# Patient Record
Sex: MALE | URBAN - METROPOLITAN AREA
[De-identification: names, ages, dates, MRNs, and addresses within clinical notes are randomized per-mention and may not be internally consistent; named-entity substitution may affect disease eponyms.]

---

## 2023-03-03 ENCOUNTER — OFFICE VISIT (OUTPATIENT)
Dept: URBAN - METROPOLITAN AREA CLINIC 35 | Facility: CLINIC | Age: 48
End: 2023-03-03
Payer: SELF-PAY

## 2023-03-03 VITALS
HEART RATE: 54 BPM | HEIGHT: 66 IN | OXYGEN SATURATION: 99 % | SYSTOLIC BLOOD PRESSURE: 122 MMHG | BODY MASS INDEX: 22.98 KG/M2 | DIASTOLIC BLOOD PRESSURE: 80 MMHG | WEIGHT: 143 LBS

## 2023-03-03 DIAGNOSIS — Z12.11 ENCOUNTER FOR SCREENING FOR MALIGNANT NEOPLASM OF COLON: ICD-10-CM

## 2023-03-03 DIAGNOSIS — K59.1 FUNCTIONAL DIARRHEA: ICD-10-CM

## 2023-03-03 DIAGNOSIS — Z12.12 ENCOUNTER FOR SCREENING FOR MALIGNANT NEOPLASM OF RECTUM: ICD-10-CM

## 2023-03-03 DIAGNOSIS — R10.10 PAIN OF UPPER ABDOMEN: ICD-10-CM

## 2023-03-03 PROCEDURE — 99204 OFFICE O/P NEW MOD 45 MIN: CPT | Performed by: PHYSICIAN ASSISTANT

## 2023-03-03 RX ORDER — SODIUM, POTASSIUM,MAG SULFATES 17.5-3.13G
177ML SOLUTION, RECONSTITUTED, ORAL ORAL
Qty: 1 | Refills: 0 | OUTPATIENT
Start: 2023-03-03 | End: 2023-03-05

## 2023-03-03 RX ORDER — FAMOTIDINE 40 MG/1
1 TABLET AT BEDTIME TABLET, FILM COATED ORAL ONCE A DAY
Qty: 30 | Refills: 2 | OUTPATIENT
Start: 2023-03-03

## 2023-03-03 NOTE — HPI-ABDOMINAL PAIN
47 year old [place] speaking male patient presents today for abdominal pain that is located in the upper abdomen, and is intermittent in nature.  Son is .  Pain onset was about a year ago, with episodes occurring every day and symptoms are worse at night. Admits eating spicy foods and drinking alcohol as aggravating factors. Denies any alleviating factors.  He is unsure how to describe the abd pain.  Denies any recent imaging or procedures for symptoms.  No heartburn, dysphagia, nausea or emesis.  Never had an EGD.  He had labs drawn 2 months ago that were normal.

## 2023-03-03 NOTE — HPI-COLORECTAL CANCER SCREENING
Pt has never had a colonoscopy.  He denies fhx of colon cancer or colon polyps.  He admits to 2-3 bowel movements a day.  He admits to diarrhea intermittently a couple times a week.  He states spicy foods will trigger diarrhea.

## 2023-03-30 ENCOUNTER — OFFICE VISIT (OUTPATIENT)
Dept: URBAN - METROPOLITAN AREA SURGERY CENTER 8 | Facility: SURGERY CENTER | Age: 48
End: 2023-03-30
Payer: SELF-PAY

## 2023-03-30 ENCOUNTER — TELEPHONE ENCOUNTER (OUTPATIENT)
Dept: URBAN - METROPOLITAN AREA CLINIC 35 | Facility: CLINIC | Age: 48
End: 2023-03-30

## 2023-03-30 ENCOUNTER — CLAIMS CREATED FROM THE CLAIM WINDOW (OUTPATIENT)
Dept: URBAN - METROPOLITAN AREA CLINIC 4 | Facility: CLINIC | Age: 48
End: 2023-03-30
Payer: SELF-PAY

## 2023-03-30 ENCOUNTER — TELEPHONE ENCOUNTER (OUTPATIENT)
Dept: URBAN - METROPOLITAN AREA SURGERY CENTER 8 | Facility: SURGERY CENTER | Age: 48
End: 2023-03-30

## 2023-03-30 DIAGNOSIS — R10.12 ABDOMINAL BURNING SENSATION IN LEFT UPPER QUADRANT: ICD-10-CM

## 2023-03-30 DIAGNOSIS — R10.11 ABDOMINAL BURNING SENSATION IN RIGHT UPPER QUADRANT: ICD-10-CM

## 2023-03-30 DIAGNOSIS — Z12.11 COLON CANCER SCREENING: ICD-10-CM

## 2023-03-30 DIAGNOSIS — K90.0 CELIAC DISEASE: ICD-10-CM

## 2023-03-30 DIAGNOSIS — K31.89 ACQUIRED DEFORMITY OF DUODENUM: ICD-10-CM

## 2023-03-30 DIAGNOSIS — K29.60 ADENOPAPILLOMATOSIS GASTRICA: ICD-10-CM

## 2023-03-30 PROCEDURE — 88342 IMHCHEM/IMCYTCHM 1ST ANTB: CPT | Performed by: PATHOLOGY

## 2023-03-30 PROCEDURE — 43239 EGD BIOPSY SINGLE/MULTIPLE: CPT | Performed by: INTERNAL MEDICINE

## 2023-03-30 PROCEDURE — 88305 TISSUE EXAM BY PATHOLOGIST: CPT | Performed by: PATHOLOGY

## 2023-03-30 PROCEDURE — G0121 COLON CA SCRN NOT HI RSK IND: HCPCS | Performed by: INTERNAL MEDICINE

## 2023-03-30 PROCEDURE — G8907 PT DOC NO EVENTS ON DISCHARG: HCPCS | Performed by: INTERNAL MEDICINE

## 2023-03-30 RX ORDER — FAMOTIDINE 40 MG/1
1 TABLET AT BEDTIME TABLET, FILM COATED ORAL ONCE A DAY
Qty: 30 | Refills: 2 | Status: ACTIVE | COMMUNITY
Start: 2023-03-03

## 2023-03-30 RX ORDER — FAMOTIDINE 40 MG/1
1 TABLET AT BEDTIME TABLET, FILM COATED ORAL ONCE A DAY
Qty: 30 | Refills: 2
Start: 2023-03-03

## 2023-04-04 ENCOUNTER — LAB OUTSIDE AN ENCOUNTER (OUTPATIENT)
Dept: URBAN - METROPOLITAN AREA CLINIC 35 | Facility: CLINIC | Age: 48
End: 2023-04-04

## 2023-04-20 ENCOUNTER — OFFICE VISIT (OUTPATIENT)
Dept: URBAN - METROPOLITAN AREA CLINIC 35 | Facility: CLINIC | Age: 48
End: 2023-04-20
Payer: SELF-PAY

## 2023-04-20 VITALS
HEIGHT: 66 IN | DIASTOLIC BLOOD PRESSURE: 76 MMHG | BODY MASS INDEX: 23.14 KG/M2 | SYSTOLIC BLOOD PRESSURE: 120 MMHG | WEIGHT: 144 LBS

## 2023-04-20 DIAGNOSIS — R10.10 UPPER ABDOMINAL PAIN: ICD-10-CM

## 2023-04-20 DIAGNOSIS — K64.0 GRADE I HEMORRHOIDS: ICD-10-CM

## 2023-04-20 DIAGNOSIS — K44.9 HIATAL HERNIA: ICD-10-CM

## 2023-04-20 DIAGNOSIS — K31.7 BENIGN POLYP OF DUODENUM: ICD-10-CM

## 2023-04-20 DIAGNOSIS — K29.30 CHRONIC SUPERFICIAL GASTRITIS WITHOUT BLEEDING: ICD-10-CM

## 2023-04-20 PROBLEM — 682831000119104: Status: ACTIVE | Noted: 2023-04-20

## 2023-04-20 PROBLEM — 196735001: Status: ACTIVE | Noted: 2023-04-20

## 2023-04-20 PROCEDURE — 99214 OFFICE O/P EST MOD 30 MIN: CPT | Performed by: INTERNAL MEDICINE

## 2023-04-20 RX ORDER — FAMOTIDINE 40 MG/1
1 TABLET AT BEDTIME TABLET, FILM COATED ORAL ONCE A DAY
Qty: 30 | Refills: 2 | Status: ACTIVE | COMMUNITY
Start: 2023-03-03

## 2023-04-20 NOTE — HPI-ABDOMINAL PAIN
Admits continued episodes of abdominal pain since his last visit. He admits the use the continued use of famotidine 40 mg at bedtime with relief of symptoms.   Last office visit 03/03/2023 47 year old [place] speaking male patient presents today for abdominal pain that is located in the upper abdomen, and is intermittent in nature.  Son is .  Pain onset was about a year ago, with episodes occurring every day and symptoms are worse at night. Admits eating spicy foods and drinking alcohol as aggravating factors. Denies any alleviating factors.  He is unsure how to describe the abd pain.  Denies any recent imaging or procedures for symptoms.  No heartburn, dysphagia, nausea or emesis.  Never had an EGD.  He had labs drawn 2 months ago that were normal.

## 2023-04-20 NOTE — HPI-ENDOSCOPY (EGD) FOLLOWUP
EGD was completed on 03/30/2023 with findings of:  (A) Duodenum, Second Part (D2) INCREASED NUMBERS OF INTRAEPITHELIAL LYMPHOCYTES WITH NORMAL VILLOUS   (B) Duodenum, Second Part (D2) INCREASED NUMBERS OF INTRAEPITHELIAL LYMPHOCYTES WITH NORMAL VILLOUS               (C) CHRONIC INACTIVE GASTRITIS WITH HISTOLOGICAL CHANGES SUGGESTIVE OF              TREATED H. PYLORI GASTRITIS.  CT of the Abdomen Pelvis completed on 04/04/2023 was normal.

## 2023-04-25 LAB
H PYLORI BREATH TEST: NOT DETECTED
H. PYLORI BREATH TEST: NOT DETECTED
IMMUNOGLOBULIN A: 162
INTERPRETATION: (no result)
INTERPRETATION: NOT DETECTED
TISSUE TRANSGLUTAMINASE AB, IGA: <1

## 2023-05-03 ENCOUNTER — TELEPHONE ENCOUNTER (OUTPATIENT)
Dept: URBAN - METROPOLITAN AREA CLINIC 35 | Facility: CLINIC | Age: 48
End: 2023-05-03

## 2023-05-03 RX ORDER — DICYCLOMINE HYDROCHLORIDE 20 MG/1
1 TABLET TABLET ORAL
Qty: 30 | Refills: 1 | OUTPATIENT
Start: 2023-05-04 | End: 2023-07-03

## 2023-05-04 ENCOUNTER — LAB OUTSIDE AN ENCOUNTER (OUTPATIENT)
Dept: URBAN - METROPOLITAN AREA CLINIC 35 | Facility: CLINIC | Age: 48
End: 2023-05-04

## 2023-12-22 ENCOUNTER — DASHBOARD ENCOUNTERS (OUTPATIENT)
Age: 48
End: 2023-12-22

## 2023-12-22 ENCOUNTER — OFFICE VISIT (OUTPATIENT)
Dept: URBAN - METROPOLITAN AREA CLINIC 82 | Facility: CLINIC | Age: 48
End: 2023-12-22
Payer: SELF-PAY

## 2023-12-22 VITALS
TEMPERATURE: 98.1 F | BODY MASS INDEX: 23.21 KG/M2 | WEIGHT: 144.4 LBS | HEIGHT: 66 IN | SYSTOLIC BLOOD PRESSURE: 132 MMHG | DIASTOLIC BLOOD PRESSURE: 75 MMHG | HEART RATE: 51 BPM

## 2023-12-22 DIAGNOSIS — K29.30 CHRONIC SUPERFICIAL GASTRITIS WITHOUT BLEEDING: ICD-10-CM

## 2023-12-22 DIAGNOSIS — K31.7 BENIGN POLYP OF DUODENUM: ICD-10-CM

## 2023-12-22 DIAGNOSIS — K44.9 HERNIA, HIATAL: ICD-10-CM

## 2023-12-22 DIAGNOSIS — K21.9 GASTROESOPHAGEAL REFLUX DISEASE WITHOUT ESOPHAGITIS: ICD-10-CM

## 2023-12-22 PROBLEM — 84089009: Status: ACTIVE | Noted: 2023-12-22

## 2023-12-22 PROBLEM — 266435005: Status: ACTIVE | Noted: 2023-12-22

## 2023-12-22 PROCEDURE — 99213 OFFICE O/P EST LOW 20 MIN: CPT | Performed by: INTERNAL MEDICINE

## 2023-12-22 RX ORDER — FAMOTIDINE 40 MG/1
1 TABLET AT BEDTIME TABLET, FILM COATED ORAL ONCE A DAY
Qty: 30 | Refills: 2 | Status: ON HOLD | COMMUNITY
Start: 2023-03-03

## 2023-12-22 NOTE — HPI-TODAY'S VISIT:
47 y/o  man with recent procedures  on 3/2023 EGD shows small hiatal hernia,no Ugalde's,no H pylori on biopsies or UBT. Colonoscopy with no significant abnormalities or polyps.I review with him all reports including blood test and recommendations for GERD.

## 2024-12-13 ENCOUNTER — LAB OUTSIDE AN ENCOUNTER (OUTPATIENT)
Dept: URBAN - METROPOLITAN AREA CLINIC 82 | Facility: CLINIC | Age: 49
End: 2024-12-13

## 2024-12-13 ENCOUNTER — OFFICE VISIT (OUTPATIENT)
Dept: URBAN - METROPOLITAN AREA CLINIC 82 | Facility: CLINIC | Age: 49
End: 2024-12-13
Payer: SELF-PAY

## 2024-12-13 VITALS
TEMPERATURE: 98.6 F | BODY MASS INDEX: 23.66 KG/M2 | HEIGHT: 66 IN | WEIGHT: 147.2 LBS | DIASTOLIC BLOOD PRESSURE: 83 MMHG | SYSTOLIC BLOOD PRESSURE: 135 MMHG | HEART RATE: 55 BPM

## 2024-12-13 DIAGNOSIS — R14.0 ABDOMINAL BLOATING: ICD-10-CM

## 2024-12-13 DIAGNOSIS — K29.30 CHRONIC SUPERFICIAL GASTRITIS WITHOUT BLEEDING: ICD-10-CM

## 2024-12-13 DIAGNOSIS — R19.7 ACUTE DIARRHEA: ICD-10-CM

## 2024-12-13 DIAGNOSIS — K21.9 GASTROESOPHAGEAL REFLUX DISEASE WITHOUT ESOPHAGITIS: ICD-10-CM

## 2024-12-13 PROBLEM — 236071009: Status: ACTIVE | Noted: 2024-12-13

## 2024-12-13 PROBLEM — 116289008: Status: ACTIVE | Noted: 2024-12-13

## 2024-12-13 PROCEDURE — 99213 OFFICE O/P EST LOW 20 MIN: CPT | Performed by: INTERNAL MEDICINE

## 2024-12-13 RX ORDER — FAMOTIDINE 40 MG/1
1 TABLET AT BEDTIME TABLET, FILM COATED ORAL ONCE A DAY
Qty: 30 | Refills: 2 | Status: ON HOLD | COMMUNITY
Start: 2023-03-03

## 2024-12-13 NOTE — PHYSICAL EXAM GASTROINTESTINAL
normal bowel sounds , no masses palpable , no guarding or rigidity , soft, nontender, nondistended <-- Click to add NO significant Past Surgical History

## 2024-12-13 NOTE — HPI-TODAY'S VISIT:
50 y/o  man with hx of duodenal polyps,small HH came for eval given chronic abdominal pain,GERD,bloating anf diarrhea. Denies weight loss or GI bleeding

## 2025-01-16 ENCOUNTER — TELEPHONE ENCOUNTER (OUTPATIENT)
Dept: URBAN - METROPOLITAN AREA CLINIC 5 | Facility: CLINIC | Age: 50
End: 2025-01-16

## 2025-02-12 ENCOUNTER — CLAIMS CREATED FROM THE CLAIM WINDOW (OUTPATIENT)
Dept: URBAN - METROPOLITAN AREA SURGERY CENTER 13 | Facility: SURGERY CENTER | Age: 50
End: 2025-02-12
Payer: SELF-PAY

## 2025-02-12 ENCOUNTER — CLAIMS CREATED FROM THE CLAIM WINDOW (OUTPATIENT)
Dept: URBAN - METROPOLITAN AREA CLINIC 4 | Facility: CLINIC | Age: 50
End: 2025-02-12
Payer: SELF-PAY

## 2025-02-12 DIAGNOSIS — K29.70 GASTRITIS, UNSPECIFIED, WITHOUT BLEEDING: ICD-10-CM

## 2025-02-12 DIAGNOSIS — R19.7 DIARRHEA: ICD-10-CM

## 2025-02-12 DIAGNOSIS — K21.9 GASTROESOPHAGEAL REFLUX DISEASE: ICD-10-CM

## 2025-02-12 DIAGNOSIS — K29.50 CHRONIC GASTRITIS WITHOUT BLEEDING: ICD-10-CM

## 2025-02-12 DIAGNOSIS — K21.9 GASTRO-ESOPHAGEAL REFLUX DISEASE WITHOUT ESOPHAGITIS: ICD-10-CM

## 2025-02-12 DIAGNOSIS — K31.89 OTHER DISEASES OF STOMACH AND DUODENUM: ICD-10-CM

## 2025-02-12 PROCEDURE — 00731 ANES UPR GI NDSC PX NOS: CPT | Performed by: NURSE ANESTHETIST, CERTIFIED REGISTERED

## 2025-02-12 PROCEDURE — 88342 IMHCHEM/IMCYTCHM 1ST ANTB: CPT | Performed by: PATHOLOGY

## 2025-02-12 PROCEDURE — 43239 EGD BIOPSY SINGLE/MULTIPLE: CPT | Performed by: INTERNAL MEDICINE

## 2025-02-12 PROCEDURE — 00731 ANES UPR GI NDSC PX NOS: CPT | Performed by: CASE MANAGER/CARE COORDINATOR

## 2025-02-12 PROCEDURE — 88305 TISSUE EXAM BY PATHOLOGIST: CPT | Performed by: PATHOLOGY

## 2025-02-12 RX ORDER — FAMOTIDINE 40 MG/1
1 TABLET AT BEDTIME TABLET, FILM COATED ORAL ONCE A DAY
Qty: 30 | Refills: 2 | Status: ON HOLD | COMMUNITY
Start: 2023-03-03